# Patient Record
Sex: FEMALE | Race: WHITE
[De-identification: names, ages, dates, MRNs, and addresses within clinical notes are randomized per-mention and may not be internally consistent; named-entity substitution may affect disease eponyms.]

---

## 2018-06-15 ENCOUNTER — HOSPITAL ENCOUNTER (EMERGENCY)
Dept: HOSPITAL 17 - NEPC | Age: 38
Discharge: HOME | End: 2018-06-15
Payer: COMMERCIAL

## 2018-06-15 VITALS
RESPIRATION RATE: 20 BRPM | TEMPERATURE: 97.8 F | HEART RATE: 83 BPM | DIASTOLIC BLOOD PRESSURE: 81 MMHG | SYSTOLIC BLOOD PRESSURE: 135 MMHG | OXYGEN SATURATION: 99 %

## 2018-06-15 VITALS
SYSTOLIC BLOOD PRESSURE: 165 MMHG | RESPIRATION RATE: 22 BRPM | DIASTOLIC BLOOD PRESSURE: 83 MMHG | HEART RATE: 87 BPM | OXYGEN SATURATION: 98 %

## 2018-06-15 VITALS — TEMPERATURE: 98.2 F | OXYGEN SATURATION: 100 % | RESPIRATION RATE: 18 BRPM | HEART RATE: 100 BPM

## 2018-06-15 VITALS — BODY MASS INDEX: 37.72 KG/M2 | HEIGHT: 67 IN | WEIGHT: 240.3 LBS

## 2018-06-15 VITALS — OXYGEN SATURATION: 98 %

## 2018-06-15 DIAGNOSIS — Z79.899: ICD-10-CM

## 2018-06-15 DIAGNOSIS — J40: Primary | ICD-10-CM

## 2018-06-15 DIAGNOSIS — I10: ICD-10-CM

## 2018-06-15 LAB
BASOPHILS # BLD AUTO: 0.1 TH/MM3 (ref 0–0.2)
BASOPHILS NFR BLD: 0.6 % (ref 0–2)
BUN SERPL-MCNC: 11 MG/DL (ref 7–18)
CALCIUM SERPL-MCNC: 8.5 MG/DL (ref 8.5–10.1)
CHLORIDE SERPL-SCNC: 103 MEQ/L (ref 98–107)
CREAT SERPL-MCNC: 1.05 MG/DL (ref 0.5–1)
D-DIMER: 0.67 MG/L FEU (ref 0–0.5)
EOSINOPHIL # BLD: 0.3 TH/MM3 (ref 0–0.4)
EOSINOPHIL NFR BLD: 2.5 % (ref 0–4)
ERYTHROCYTE [DISTWIDTH] IN BLOOD BY AUTOMATED COUNT: 14.7 % (ref 11.6–17.2)
GFR SERPLBLD BASED ON 1.73 SQ M-ARVRAT: 59 ML/MIN (ref 89–?)
GLUCOSE SERPL-MCNC: 90 MG/DL (ref 74–106)
HCO3 BLD-SCNC: 23.3 MEQ/L (ref 21–32)
HCT VFR BLD CALC: 37.8 % (ref 35–46)
HGB BLD-MCNC: 12.8 GM/DL (ref 11.6–15.3)
INR PPP: 1 RATIO
LYMPHOCYTES # BLD AUTO: 2.1 TH/MM3 (ref 1–4.8)
LYMPHOCYTES NFR BLD AUTO: 20.3 % (ref 9–44)
MAGNESIUM SERPL-MCNC: 2.1 MG/DL (ref 1.5–2.5)
MCH RBC QN AUTO: 28.1 PG (ref 27–34)
MCHC RBC AUTO-ENTMCNC: 33.8 % (ref 32–36)
MCV RBC AUTO: 83 FL (ref 80–100)
MONOCYTE #: 0.8 TH/MM3 (ref 0–0.9)
MONOCYTES NFR BLD: 7.4 % (ref 0–8)
NEUTROPHILS # BLD AUTO: 7.1 TH/MM3 (ref 1.8–7.7)
NEUTROPHILS NFR BLD AUTO: 69.2 % (ref 16–70)
PLATELET # BLD: 390 TH/MM3 (ref 150–450)
PMV BLD AUTO: 8.4 FL (ref 7–11)
PROTHROMBIN TIME: 9.8 SEC (ref 9.8–11.6)
RBC # BLD AUTO: 4.56 MIL/MM3 (ref 4–5.3)
SODIUM SERPL-SCNC: 138 MEQ/L (ref 136–145)
TROPONIN I SERPL-MCNC: (no result) NG/ML (ref 0.02–0.05)
WBC # BLD AUTO: 10.3 TH/MM3 (ref 4–11)

## 2018-06-15 PROCEDURE — 82550 ASSAY OF CK (CPK): CPT

## 2018-06-15 PROCEDURE — 71275 CT ANGIOGRAPHY CHEST: CPT

## 2018-06-15 PROCEDURE — 80048 BASIC METABOLIC PNL TOTAL CA: CPT

## 2018-06-15 PROCEDURE — 93005 ELECTROCARDIOGRAM TRACING: CPT

## 2018-06-15 PROCEDURE — 71046 X-RAY EXAM CHEST 2 VIEWS: CPT

## 2018-06-15 PROCEDURE — 99285 EMERGENCY DEPT VISIT HI MDM: CPT

## 2018-06-15 PROCEDURE — 85610 PROTHROMBIN TIME: CPT

## 2018-06-15 PROCEDURE — 85025 COMPLETE CBC W/AUTO DIFF WBC: CPT

## 2018-06-15 PROCEDURE — 94664 DEMO&/EVAL PT USE INHALER: CPT

## 2018-06-15 PROCEDURE — 82552 ASSAY OF CPK IN BLOOD: CPT

## 2018-06-15 PROCEDURE — 85730 THROMBOPLASTIN TIME PARTIAL: CPT

## 2018-06-15 PROCEDURE — 94640 AIRWAY INHALATION TREATMENT: CPT

## 2018-06-15 PROCEDURE — 85379 FIBRIN DEGRADATION QUANT: CPT

## 2018-06-15 PROCEDURE — 83735 ASSAY OF MAGNESIUM: CPT

## 2018-06-15 PROCEDURE — 96374 THER/PROPH/DIAG INJ IV PUSH: CPT

## 2018-06-15 PROCEDURE — 84484 ASSAY OF TROPONIN QUANT: CPT

## 2018-06-15 RX ADMIN — IPRATROPIUM BROMIDE AND ALBUTEROL SULFATE SCH AMPULE: .5; 3 SOLUTION RESPIRATORY (INHALATION) at 17:15

## 2018-06-15 NOTE — PD
HPI


Chief Complaint:  Cold / Flu Symptoms


Time Seen by Provider:  17:05


Travel History


International Travel<30 days:  No


Contact w/Intl Traveler<30days:  No


Traveled to known affect area:  No





History of Present Illness


HPI


37-year-old female presents emergency department for evaluation of chest pain 

and shortness of breath since 6 AM this morning.  Patient states she has had 

one bout of coughing but otherwise has had no cough throughout the day.  She 

reports a tightness and moderate pain with inspiration.  She denies any fever 

or chills.  The pain does not radiate anywhere.  She has no nausea, vomiting, 

lightheadedness, diaphoresis.  Patient denies any history of PE or DVT.  She 

has not had any long travel.  She is not currently on birth control.  She does 

not smoke tobacco cigarettes currently.





PFS


Past Medical History


Cancer:  Yes (THROID CANCER )


Hypertension:  Yes


Pregnant?:  Not Pregnant





Past Surgical History


 Section:  Yes (X2)


Other Surgery:  Yes (THYROIDECTOMY)





Social History


Alcohol Use:  Yes (OCCASSIONALLY)


Tobacco Use:  No


Substance Use:  No





Allergies-Medications


(Allergen,Severity, Reaction):  


Coded Allergies:  


     lisinopril (Verified  Allergy, Severe, Anaphylaxis, 6/15/18)


Reported Meds & Prescriptions





Reported Meds & Active Scripts


Active


Reported


Cytomel (Liothyronine Sodium) 25 Mcg Tab 5 Mcg PO BID


Synthroid (Levothyroxine Sodium) 200 Mcg Tab 200 Mcg PO DAILY


Hydrochlorothiazide 25 Mg Tab 25 Mg PO DAILY








Review of Systems


Except as stated in HPI:  all other systems reviewed are Neg





Physical Exam


Narrative


GENERAL: Well-nourished female patient, appears in no acute distress.


SKIN: Focused skin assessment warm/dry.


HEAD: Atraumatic. Normocephalic. 


EYES: Pupils equal and round. No scleral icterus. No injection or drainage. 


ENT: No nasal bleeding or discharge.  Mucous membranes pink and moist.


NECK: Trachea midline. No JVD. 


CARDIOVASCULAR: Elevated rate and rhythm.  No murmur appreciated.


RESPIRATORY: No accessory muscle use.  Diminished with inspiratory and 

expiratory wheeze to auscultation. Breath sounds equal bilaterally. 


GASTROINTESTINAL: Abdomen soft, non-tender, nondistended. Hepatic and splenic 

margins not palpable. 


MUSCULOSKELETAL: No obvious deformities. No clubbing.  No cyanosis.  No edema. 


NEUROLOGICAL: Awake and alert. No obvious cranial nerve deficits.  Motor 

grossly within normal limits. Normal speech.


PSYCHIATRIC: Appropriate mood and affect; insight and judgment normal.





Data


Data


Last Documented VS





Vital Signs








  Date Time  Temp Pulse Resp B/P (MAP) Pulse Ox O2 Delivery O2 Flow Rate FiO2


 


6/15/18 19:25        


 


6/15/18 18:00 97.8 83 20  99 Room Air  








Orders





 Orders


Electrocardiogram (6/15/18 )


Basic Metabolic Panel (Bmp) (6/15/18 17:08)


Ckmb (Isoenzyme) Profile (6/15/18 17:08)


Complete Blood Count With Diff (6/15/18 17:08)


D-Dimer (6/15/18 17:08)


Magnesium (Mg) (6/15/18 17:08)


Prothrombin Time / Inr (Pt) (6/15/18 17:08)


Act Partial Throm Time (Ptt) (6/15/18 17:08)


Troponin I (6/15/18 17:08)


Ecg Monitoring (6/15/18 17:08)


Bilateral Bp Monitoring (6/15/18 17:08)


Iv Access Insert/Monitor (6/15/18 17:08)


Oximetry (6/15/18 17:08)


Oxygen Administration (6/15/18 17:08)


Aspirin Chew (Aspirin Chew) (6/15/18 17:15)


Sodium Chloride 0.9% Flush (Ns Flush) (6/15/18 17:15)


Chest, Pa & Lat (6/15/18 17:08)


Methylprednisolone So Succ Inj (Solumedr (6/15/18 17:15)


Albuterol-Ipratropium Neb (Duoneb Neb) (6/15/18 17:15)


Ct Pulmonary Angiogram (6/15/18 )


CKMB (6/15/18 17:11)


CKMB% (6/15/18 17:11)


Iohexol 350 Inj (Omnipaque 350 Inj) (6/15/18 18:50)


Ed Discharge Order (6/15/18 19:23)





Labs





Laboratory Tests








Test


  6/15/18


17:11


 


White Blood Count 10.3 TH/MM3 


 


Red Blood Count 4.56 MIL/MM3 


 


Hemoglobin 12.8 GM/DL 


 


Hematocrit 37.8 % 


 


Mean Corpuscular Volume 83.0 FL 


 


Mean Corpuscular Hemoglobin 28.1 PG 


 


Mean Corpuscular Hemoglobin


Concent 33.8 % 


 


 


Red Cell Distribution Width 14.7 % 


 


Platelet Count 390 TH/MM3 


 


Mean Platelet Volume 8.4 FL 


 


Neutrophils (%) (Auto) 69.2 % 


 


Lymphocytes (%) (Auto) 20.3 % 


 


Monocytes (%) (Auto) 7.4 % 


 


Eosinophils (%) (Auto) 2.5 % 


 


Basophils (%) (Auto) 0.6 % 


 


Neutrophils # (Auto) 7.1 TH/MM3 


 


Lymphocytes # (Auto) 2.1 TH/MM3 


 


Monocytes # (Auto) 0.8 TH/MM3 


 


Eosinophils # (Auto) 0.3 TH/MM3 


 


Basophils # (Auto) 0.1 TH/MM3 


 


CBC Comment DIFF FINAL 


 


Differential Comment  


 


Prothrombin Time 9.8 SEC 


 


Prothromb Time International


Ratio 1.0 RATIO 


 


 


Activated Partial


Thromboplast Time 27.8 SEC 


 


 


D-Dimer Quantitative (PE/DVT) 0.67 MG/L FEU 


 


Blood Urea Nitrogen 11 MG/DL 


 


Creatinine 1.05 MG/DL 


 


Random Glucose 90 MG/DL 


 


Calcium Level 8.5 MG/DL 


 


Magnesium Level 2.1 MG/DL 


 


Sodium Level 138 MEQ/L 


 


Potassium Level 3.2 MEQ/L 


 


Chloride Level 103 MEQ/L 


 


Carbon Dioxide Level 23.3 MEQ/L 


 


Anion Gap 12 MEQ/L 


 


Estimat Glomerular Filtration


Rate 59 ML/MIN 


 


 


Total Creatine Kinase 101 U/L 


 


Creatine Kinase MB 0.8 NG/ML 


 


Troponin I


  LESS THAN 0.02


NG/ML











MDM


Medical Decision Making


Medical Screen Exam Complete:  Yes


Emergency Medical Condition:  Yes


Medical Record Reviewed:  Yes


Differential Diagnosis


Bronchitis versus esophagitis versus esophageal spasm versus pneumonia  versus 

PE


Narrative Course


37-year-old female presents emergency department for evaluation.  Patient 

appears without distress.  Vital signs are stable.  Patient does have 

inspiratory and expiratory wheeze.  She will be given IV Solu-Medrol and DuoNeb 

treatment.





Laboratory Tests








Test


  6/15/18


17:11


 


White Blood Count 10.3 TH/MM3 


 


Red Blood Count 4.56 MIL/MM3 


 


Hemoglobin 12.8 GM/DL 


 


Hematocrit 37.8 % 


 


Mean Corpuscular Volume 83.0 FL 


 


Mean Corpuscular Hemoglobin 28.1 PG 


 


Mean Corpuscular Hemoglobin


Concent 33.8 % 


 


 


Red Cell Distribution Width 14.7 % 


 


Platelet Count 390 TH/MM3 


 


Mean Platelet Volume 8.4 FL 


 


Neutrophils (%) (Auto) 69.2 % 


 


Lymphocytes (%) (Auto) 20.3 % 


 


Monocytes (%) (Auto) 7.4 % 


 


Eosinophils (%) (Auto) 2.5 % 


 


Basophils (%) (Auto) 0.6 % 


 


Neutrophils # (Auto) 7.1 TH/MM3 


 


Lymphocytes # (Auto) 2.1 TH/MM3 


 


Monocytes # (Auto) 0.8 TH/MM3 


 


Eosinophils # (Auto) 0.3 TH/MM3 


 


Basophils # (Auto) 0.1 TH/MM3 


 


CBC Comment DIFF FINAL 


 


Differential Comment  


 


Prothrombin Time 9.8 SEC 


 


Prothromb Time International


Ratio 1.0 RATIO 


 


 


Activated Partial


Thromboplast Time 27.8 SEC 


 


 


D-Dimer Quantitative (PE/DVT) 0.67 MG/L FEU 


 


Blood Urea Nitrogen 11 MG/DL 


 


Creatinine 1.05 MG/DL 


 


Random Glucose 90 MG/DL 


 


Calcium Level 8.5 MG/DL 


 


Magnesium Level 2.1 MG/DL 


 


Sodium Level 138 MEQ/L 


 


Potassium Level 3.2 MEQ/L 


 


Chloride Level 103 MEQ/L 


 


Carbon Dioxide Level 23.3 MEQ/L 


 


Anion Gap 12 MEQ/L 


 


Estimat Glomerular Filtration


Rate 59 ML/MIN 


 


 


Total Creatine Kinase 101 U/L 


 


Creatine Kinase MB 0.8 NG/ML 


 


Troponin I


  LESS THAN 0.02


NG/ML





Patient's d-dimer is elevated.  CT pulmonary angiogram is ordered.





Last Impressions








Chest X-Ray 6/15/18 1708 Signed





Impressions: 





 CONCLUSION: 





 No active disease.





  





 


 


CT Angiography 6/15/18 0000 Signed





Impressions: 





 CONCLUSION:





 1.  Negative for pulmonary embolus. No acute findings.





  





 





Patient will be discharged home.  She will be given short course of oral 

steroids and pro-air inhaler.  She is encouraged to follow-up with primary care 

provider and return immediately with acute worsening symptoms.





Diagnosis





 Primary Impression:  


 Bronchitis


 Additional Impression:  


 Costochondral pain


Referrals:  


Primary Care Physician


Patient Instructions:  Acute Bronchitis (ED), General Instructions


Departure Forms:  Tests/Procedures, Work Release   Enter return to work date:  

2018





***Additional Instructions:  


Humidified air may help to alleviate symptoms


Follow-up with a primary care provider


Return immediately with acute worsening symptoms


***Med/Other Pt SpecificInfo:  Prescription(s) given


Scripts


Albuterol 8.5 GM Inh (Proair Hfa 8.5 GM Inh) 90 Mcg/Act Aer


2 PUFF INH Q4H Y for SHORTNESS OF BREATH, #1 INHALER 0 Refills


   108 mcg/actuation


   Prov: Loreto Thapa         6/15/18 


Prednisone (Prednisone) 50 Mg Tab


50 MG PO DAILY for 5 Days, #5 TAB 0 Refills


   Prov: Loreot Thapa         6/15/18 


Azithromycin (Zithromax Z-Sergey) 250 Mg Dspk


250 MG PO AS DIRECTED for Infection, #1 DSPK 0 Refills


   500 MG (2 tabs) day 1, then 1 tab days 2-5.


   Prov: Loreto Thapa         6/15/18


Disposition:  01 DISCHARGE HOME


Condition:  Stable











Lroeto Thapa Elmer 15, 2018 17:09

## 2018-06-15 NOTE — RADRPT
EXAM DATE:  6/15/2018 7:04 PM EDT

AGE/SEX:        37 years / Female



INDICATIONS:  Chest pain.



CLINICAL DATA:  This is the patient's initial encounter. Patient reports that signs and symptoms have
 been present for 1 day and indicates a pain score of 5/10. 

                                                                          

MEDICAL/SURGICAL HISTORY:   Hypertension.  Carcinoma, thyroid. Thyroidectomy.



RADIATION DOSE:  10.91 CTDI (mGy)









COMPARISON:      No prior exams available for comparison. 





TECHNIQUE:  Volumetric scanning was performed using a multi-row detector CT scanner during bolus infu
neslon of 75 ml Omnipaque 350 (iohexol)  nonionic water-soluble contrast as a single exam dose. The janee
a was post processed with a variety of visualization algorithms including full volume maximum intensi
ty projection and sliding thin slab reformation.  Using automated exposure control and adjustment of 
the mA and/or kV according to patient size, radiation dose was kept as low as reasonably achievable t
o obtain optimal diagnostic quality images.



FINDINGS:  

No filling defects to suggest pulmonary embolic disease. There is no pleural or pericardial effusion.
 No lung consolidation. No adenopathy. No acute findings in the upper abdomen.



CONCLUSION:

1.  Negative for pulmonary embolus. No acute findings.



Electronically signed by: Keo Garcia MD  6/15/2018 7:13 PM EDT

## 2018-06-15 NOTE — RADRPT
EXAM DATE:  6/15/2018 5:57 PM EDT

AGE/SEX:        37 years / Female



INDICATIONS:  Chest tightness, shortness of breath starting today



CLINICAL DATA:  This is the patient's initial encounter. Patient reports that signs and symptoms have
 been present for 1 day and indicates a pain score of 0/10. 

                                                                          

MEDICAL/SURGICAL HISTORY:       None. None.



COMPARISON:      No prior exams available for comparison. 





FINDINGS:  

PA and lateral views of the chest demonstrate the lungs to be symmetrically aerated without evidence 
of mass, infiltrate or effusion. The cardiomediastinal contours are unremarkable. Osseous structures 
are intact.



CONCLUSION: 

No active disease.



Electronically signed by: Keo Garcia MD  6/15/2018 6:09 PM EDT

## 2018-06-16 NOTE — EKG
Date Performed: 06/15/2018       Time Performed: 16:47:06

 

PTAGE:      37 years

 

EKG:      Sinus rhythm 

 

 MINIMAL ST DEPRESSION BORDERLINE ECG 

 

NO PREVIOUS TRACING            

 

DOCTOR:   Lewis Chi  Interpretating Date/Time  06/16/2018 12:36:03